# Patient Record
Sex: MALE | Race: WHITE | Employment: FULL TIME | URBAN - METROPOLITAN AREA
[De-identification: names, ages, dates, MRNs, and addresses within clinical notes are randomized per-mention and may not be internally consistent; named-entity substitution may affect disease eponyms.]

---

## 2020-03-20 ENCOUNTER — VIRTUAL VISIT (OUTPATIENT)
Dept: FAMILY MEDICINE | Facility: OTHER | Age: 25
End: 2020-03-20

## 2020-03-22 NOTE — PROGRESS NOTES
"Date: 2020 16:20:44  Clinician: Brenda Emmanuel  Clinician NPI: [id number]  Patient: Florentino martin  Patient : 1995  Patient Address: [address] [place] [id number]  Patient Phone: [phone]  Visit Protocol: URI  Patient Summary:  Florentino is a 24 year old ( : 1995 ) male who initiated a Visit for cold, sinus infection, or influenza. When asked the question \"Please sign me up to receive news, health information and promotions from [employer].\", Florentino responded \"No\".    Florentino states his symptoms started 1-2 days ago.   His symptoms consist of a headache, rhinitis, myalgia, a sore throat, and malaise.   Symptom details     Nasal secretions: The color of his mucus is yellow and green.    Sore throat: Florentino reports having severe throat pain (7-9 on a 10 point pain scale), has exudate on his tonsils, and can swallow liquids. He is not sure if the lymph nodes in his neck are enlarged. A rash has not appeared on the skin since the sore throat started.     Headache: He states the headache is moderate (4-6 on a 10 point pain scale).      Florentino denies having fever, ear pain, facial pain or pressure, wheezing, cough, nasal congestion, chills, and teeth pain. He also denies having recent facial or sinus surgery in the past 60 days, having a sinus infection within the past year, and taking antibiotic medication for the symptoms. He is not experiencing dyspnea.   Precipitating events  Within the past week, Florentino has been exposed to someone with strep throat. He has not recently been exposed to someone with influenza. Florentino has been in close contact with the following high risk individuals: children under the age of 5.   Pertinent COVID-19 (Coronavirus) information  Florentino has not traveled internationally or to the areas where COVID-19 (Coronavirus) is widespread, including cruise ship travel in the last 14 days before the start of his symptoms.   Florentino has not had a close contact with a " laboratory-confirmed COVID-19 patient within 14 days of symptom onset. He has had a close contact with a suspected COVID-19 patient within 14 days of symptom onset. Additional information about contact with COVID-19 (Coronavirus) patient as reported by the patient (free text): My wife was worked closely with a lab confirmed covid-19 patient. She is suspected of having covid-19 as well   Florentino is not a healthcare worker and does not work in a healthcare facility.   Pertinent medical history  Florentino does not need a return to work/school note.   Weight: 120 lbs   Florentino does not smoke or use smokeless tobacco.   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   But your possible exposure to Coronavirus means that we do recommend self-isolation for 14 days from the last day you may have been exposed.   What does this mean?  Isolate Yourself:   Isolate yourself at home.   Do Not allow any visitors  Do Not go to work or school  Do Not go to [employer],  centers, shopping, or other public places.  Do Not shake hands.  Avoid close contact with others (hugging, kissing).   Protect Others:   Cover Your Mouth and Nose with a mask, disposable tissue or wash cloth to avoid spreading germs to others.  Wash your hands and face frequently with soap and water.   If you have not developed a cough with fever by day 15 of isolation you are considered uninfected.  Thank you for limiting contact with others, wearing a simple mask to cover your cough, practice good hand  hygiene habits and accessing our virtual services where possible to limit the spread of this virus.  For more information about COVID19 and options for caring for yourself at home, please visit the CDC website at https://www.cdc.gov/coronavirus/2019-ncov/about/steps-when-sick.html  For more options for care at [facility], please visit our website at [email or web address]    Diagnosis: Myalgia  Diagnosis ICD: M79.1

## 2020-03-22 NOTE — PROGRESS NOTES
"Date: 2020 15:57:50  Clinician: Renee Diaz  Clinician NPI: [id number]  Patient: Florentino martin  Patient : 1995  Patient Address: [address] [place] [id number]  Patient Phone: [phone]  Visit Protocol: URI  Patient Summary:  Florentino is a 24 year old ( : 1995 ) male who initiated a Visit for COVID-19 (Coronavirus) evaluation and screening. When asked the question \"Please sign me up to receive news, health information and promotions from [employer].\", Florentino responded \"No\".    Florentino states his symptoms started 1-2 days ago.   His symptoms consist of a headache, rhinitis, myalgia, a sore throat, and malaise.   Symptom details     Nasal secretions: The color of his mucus is yellow and green.    Sore throat: Florentino reports having severe throat pain (7-9 on a 10 point pain scale), has exudate on his tonsils, and can swallow liquids. He is not sure if the lymph nodes in his neck are enlarged. A rash has not appeared on the skin since the sore throat started.     Headache: He states the headache is moderate (4-6 on a 10 point pain scale).      Florentino denies having fever, ear pain, facial pain or pressure, wheezing, cough, nasal congestion, chills, and teeth pain. He also denies having recent facial or sinus surgery in the past 60 days, having a sinus infection within the past year, and taking antibiotic medication for the symptoms. He is not experiencing dyspnea.   Precipitating events  Florentino is not sure if he has been exposed to someone with strep throat. He has not recently been exposed to someone with influenza. Florentino has been in close contact with the following high risk individuals: children under the age of 5.   Pertinent COVID-19 (Coronavirus) information  Florentino has not traveled internationally or to the areas where COVID-19 (Coronavirus) is widespread, including cruise ship travel in the last 14 days before the start of his symptoms.   Florentino has not had a close contact " with a laboratory-confirmed COVID-19 patient within 14 days of symptom onset. He has had a close contact with a suspected COVID-19 patient within 14 days of symptom onset. Additional information about contact with COVID-19 (Coronavirus) patient as reported by the patient (free text): My wife has been in contact with a lab confirmed covid-19 patient. She has been having symptoms and is suspected of having covid-19. I am not really having the same symptoms as her, but my throat is killing me. 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   Florentino is not a healthcare worker and does not work in a healthcare facility.   Pertinent medical history  Florentino does not need a return to work/school note.   Weight: 120 lbs   Florentino does not smoke or use smokeless tobacco.   Weight: 120 lbs    MEDICATIONS: No current medications, ALLERGIES: NKDA  Clinician Response:  Dear Florentino,   Dear Florentino,  Based on the information you have provided about potential exposure to Coronavirus (Covid-19) but without any symptoms of the virus (cough, fever, shortness of breath are the most common symptoms), it does not appear you need Coronavirus (COVID-19) testing at this time.   But your possible exposure to Coronavirus means that we do recommend self-isolation for 14 days from the last day you may have been exposed.   What does this mean?  Isolate Yourself:   Isolate yourself at home.   Do Not allow any visitors  Do Not go to work or school  Do Not go to [employer],  centers, shopping, or other public places.  Do Not shake hands.  Avoid close contact with others (hugging, kissing).   Protect Others:   Cover Your Mouth and Nose with a mask, disposable tissue or wash cloth to avoid spreading germs to others.  Wash your hands and face frequently with soap and water.   If you have not developed a cough with fever by day 15 of isolation you are considered uninfected.  Thank you for limiting contact with others,  wearing a simple mask to cover your cough, practice good hand hygiene habits and accessing our virtual services where possible to limit the spread of this virus.  For more information about COVID19 and options for caring for yourself at home, please visit the CDC website at https://www.cdc.gov/coronavirus/2019-ncov/about/steps-when-sick.html  For more options for care at [facility], please visit our website at [email or web address]    Diagnosis: Cough  Diagnosis ICD: R05

## 2020-03-22 NOTE — PROGRESS NOTES
"Date: 2020 16:30:16  Clinician: French Art  Clinician NPI: [id number]  Patient: Florentino martin  Patient : 1995  Patient Address: [address] [place] [address]  Patient Phone: [phone]  Visit Protocol: URI  Patient Summary:  Florentino is a 24 year old ( : 1995 ) male who initiated a Visit for cold, sinus infection, or influenza. When asked the question \"Please sign me up to receive news, health information and promotions from [employer]", Florentino responded \"No\".    Florentino states his symptoms started 1-2 days ago.   His symptoms consist of a headache, rhinitis, myalgia, a sore throat, and malaise.   Symptom details     Nasal secretions: The color of his mucus is yellow and green.    Sore throat: Florentino reports having severe throat pain (7-9 on a 10 point pain scale), has exudate on his tonsils, and can swallow liquids. He is not sure if the lymph nodes in his neck are enlarged. A rash has not appeared on the skin since the sore throat started.     Headache: He states the headache is moderate (4-6 on a 10 point pain scale).      Florentino denies having fever, ear pain, facial pain or pressure, wheezing, cough, nasal congestion, chills, and teeth pain. He also denies having recent facial or sinus surgery in the past 60 days, having a sinus infection within the past year, and taking antibiotic medication for the symptoms. He is not experiencing dyspnea.   Precipitating events  Within the past week, Florentino has been exposed to someone with strep throat. He has not recently been exposed to someone with influenza. Florentino has been in close contact with the following high risk individuals: children under the age of 5.   Pertinent COVID-19 (Coronavirus) information  Florentino has not traveled internationally or to the areas where COVID-19 (Coronavirus) is widespread, including cruise ship travel in the last 14 days before the start of his symptoms.   Florentino has not had a close contact with a " laboratory-confirmed COVID-19 patient within 14 days of symptom onset. He has had a close contact with a suspected COVID-19 patient within 14 days of symptom onset. Additional information about contact with COVID-19 (Coronavirus) patient as reported by the patient (free text): My wife is a suspected covid-19 patient. She also worked closely with a lab confirmed covid-19 patient. I do not think I have covid-19 I think I have strep.   Florentino is not a healthcare worker and does not work in a healthcare facility.   Pertinent medical history  Florentino does not need a return to work/school note.   Weight: 120 lbs   Florentino does not smoke or use smokeless tobacco.   Additional information as reported by the patient (free text): I am not looking to be screened for covid-19 that is not what I selected I pretty sure I have STREP THROAT. I have a lot of white spots on my throat please help me   Weight: 120 lbs    MEDICATIONS: No current medications, ALLERGIES: NKDA  Clinician Response:  Dear Florentino,   Your symptoms may represent strep throat. I've sent in an antibiotic to treat this.     It is contagious for 24 hours. You should change your toothbrush after 24 hours of treatment with antibiotics.     Since you have possibly been exposed to COVID-19, I still think that you should self-isolate as much as possible for about 2 weeks.          Diagnosis: Acute pharyngitis, unspecified  Diagnosis ICD: J02.9  Prescription: amoxicillin 500 mg oral capsule 20 capsule, 10 days supply. Take 1 capsule by mouth every 12 hours for 10 days. Refills: 0, Refill as needed: no, Allow substitutions: yes  Pharmacy: [facility] #[id number] - [phone] - [address] [place] [address]

## 2021-03-28 ENCOUNTER — HOSPITAL ENCOUNTER (EMERGENCY)
Facility: CLINIC | Age: 26
Discharge: HOME | End: 2021-03-28
Attending: EMERGENCY MEDICINE | Admitting: EMERGENCY MEDICINE

## 2021-03-28 VITALS
DIASTOLIC BLOOD PRESSURE: 72 MMHG | TEMPERATURE: 98.2 F | SYSTOLIC BLOOD PRESSURE: 100 MMHG | RESPIRATION RATE: 18 BRPM | OXYGEN SATURATION: 98 % | HEART RATE: 67 BPM

## 2021-03-28 DIAGNOSIS — F10.920 ALCOHOLIC INTOXICATION WITHOUT COMPLICATION (H): ICD-10-CM

## 2021-03-28 PROCEDURE — 99283 EMERGENCY DEPT VISIT LOW MDM: CPT

## 2021-03-28 ASSESSMENT — ENCOUNTER SYMPTOMS
VOMITING: 1
NAUSEA: 1
SHORTNESS OF BREATH: 0
COUGH: 0
ABDOMINAL PAIN: 0

## 2021-03-28 NOTE — DISCHARGE INSTRUCTIONS
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
## 2021-03-28 NOTE — ED NOTES
Pt resting on stretcher, pt denies any pain or medical complaints, pt reports he has been drinking alcohol tonight, pt deneis any needs he says he just wants to sleep.  Pt appears to be in good health, rn will continue to monitor.